# Patient Record
Sex: FEMALE | Race: BLACK OR AFRICAN AMERICAN | NOT HISPANIC OR LATINO | Employment: PART TIME | URBAN - METROPOLITAN AREA
[De-identification: names, ages, dates, MRNs, and addresses within clinical notes are randomized per-mention and may not be internally consistent; named-entity substitution may affect disease eponyms.]

---
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
## 2024-09-05 ENCOUNTER — OFFICE VISIT (OUTPATIENT)
Dept: OBSTETRICS AND GYNECOLOGY | Facility: CLINIC | Age: 19
End: 2024-09-05
Payer: MEDICAID

## 2024-09-05 VITALS
HEIGHT: 59 IN | SYSTOLIC BLOOD PRESSURE: 116 MMHG | DIASTOLIC BLOOD PRESSURE: 82 MMHG | BODY MASS INDEX: 36.11 KG/M2 | WEIGHT: 179.13 LBS

## 2024-09-05 DIAGNOSIS — Z11.3 SCREENING EXAMINATION FOR STD (SEXUALLY TRANSMITTED DISEASE): ICD-10-CM

## 2024-09-05 DIAGNOSIS — Z01.419 WELL WOMAN EXAM WITH ROUTINE GYNECOLOGICAL EXAM: Primary | ICD-10-CM

## 2024-09-05 DIAGNOSIS — N92.6 IRREGULAR PERIODS/MENSTRUAL CYCLES: ICD-10-CM

## 2024-09-05 LAB
B-HCG UR QL: NEGATIVE
CTP QC/QA: YES

## 2024-09-05 PROCEDURE — 87491 CHLMYD TRACH DNA AMP PROBE: CPT

## 2024-09-05 PROCEDURE — 99212 OFFICE O/P EST SF 10 MIN: CPT | Mod: PBBFAC

## 2024-09-05 PROCEDURE — 3079F DIAST BP 80-89 MM HG: CPT | Mod: CPTII,,,

## 2024-09-05 PROCEDURE — 81514 NFCT DS BV&VAGINITIS DNA ALG: CPT

## 2024-09-05 PROCEDURE — 99999PBSHW POCT URINE PREGNANCY: Mod: PBBFAC,,,

## 2024-09-05 PROCEDURE — 99999 PR PBB SHADOW E&M-EST. PATIENT-LVL II: CPT | Mod: PBBFAC,,,

## 2024-09-05 PROCEDURE — 3074F SYST BP LT 130 MM HG: CPT | Mod: CPTII,,,

## 2024-09-05 PROCEDURE — 3008F BODY MASS INDEX DOCD: CPT | Mod: CPTII,,,

## 2024-09-05 PROCEDURE — 81025 URINE PREGNANCY TEST: CPT | Mod: PBBFAC

## 2024-09-05 PROCEDURE — 99385 PREV VISIT NEW AGE 18-39: CPT | Mod: S$PBB,,,

## 2024-09-05 PROCEDURE — 1159F MED LIST DOCD IN RCRD: CPT | Mod: CPTII,,,

## 2024-09-05 NOTE — PROGRESS NOTES
CC: Annual  HISTORY OF PRESENT ILLNESS:    Layton Romero is a 18 y.o. female, , Patient's last menstrual period was 2024 (approximate).,  presents for a routine exam and has no complaints. She reports that she stopped Depo in around 2024 and cycles recently started to come back 2 months ago. We discussed that this is a normal side effect of Depo.   She is  sexually active. She uses condoms for contraception.  History of STDs: denies.  She does want STD screening.  Denies any other GYN complaints.      The patient participates in regular exercise: no.  The patient does not smoke.  The patient wears seatbelts.   Pt denies any domestic violence. Reports  tattoos or blood transfusions    SCREENING:   Pap: N/A   Gardasil Status: COMPLETE  Mammogram: N/A  Colonoscopy: N/A   DEXA:  N/A     GYN FH:   Breast cancer: none  Colon cancer: none  Ovarian cancer: none  Endometrial cancer: none     OB History    Para Term  AB Living   0 0 0 0 0 0   SAB IAB Ectopic Multiple Live Births   0 0 0 0 0        Past Medical History:  History reviewed. No pertinent past medical history.    Past Surgical History:  History reviewed. No pertinent surgical history.    Family History:  Family History   Problem Relation Name Age of Onset    Seizures Mother      No Known Problems Father      No Known Problems Sister      No Known Problems Sister      No Known Problems Sister      Hypertension Maternal Grandmother      Diabetes Maternal Grandmother      Heart disease Maternal Grandfather      Heart disease Paternal Grandmother      Breast cancer Neg Hx      Colon cancer Neg Hx      Ovarian cancer Neg Hx      Uterine cancer Neg Hx      Cervical cancer Neg Hx         Allergies:  Review of patient's allergies indicates:  No Known Allergies    Medications:  No current outpatient medications on file prior to visit.     No current facility-administered medications on file prior to visit.       Social History:  Social  History     Tobacco Use    Smoking status: Never     Passive exposure: Never    Smokeless tobacco: Never   Substance Use Topics    Alcohol use: Yes     Comment: Socially.    Drug use: Yes     Types: Marijuana               ROS:   GENERAL: Feeling well overall. Denies fever or chills.   SKIN: Denies rash or lesions.   HEAD: Denies head injury or headache.   NODES: Denies enlarged lymph nodes.   CHEST: Denies chest pain or shortness of breath.   CARDIOVASCULAR: Denies palpitations or left sided chest pain.   ABDOMEN: No abdominal pain, constipation, diarrhea, nausea, vomiting or rectal bleeding.   URINARY: No dysuria, hematuria, or burning on urination.  REPRODUCTIVE: See HPI.   BREASTS: Denies pain, lumps, or nipple discharge.   HEMATOLOGIC: No easy bruisability or excessive bleeding.   MUSCULOSKELETAL: Denies joint pain or swelling.   NEUROLOGIC: Denies syncope or weakness.   PSYCHIATRIC: Denies depression, anxiety or mood swings.    PE:   APPEARANCE: Well nourished, well developed, Black or  female in no acute distress.  NODES: no cervical, supraclavicular, or inguinal lymphadenopathy  BREASTS: Symmetrical, no skin changes or visible lesions. No palpable masses, nipple discharge or adenopathy bilaterally.  ABDOMEN: Soft. No tenderness or masses. No distention. No hernias palpated. No CVA tenderness.  VULVA: No lesions. Normal external female genitalia.  URETHRAL MEATUS: Normal size and location, no lesions, no prolapse.  URETHRA: No masses, tenderness, or prolapse.  VAGINA:  Moist. No lesions or lacerations noted. No abnormal discharge present. No odor present.   CERVIX: No lesions or discharge. No cervical motion tenderness.   UTERUS: Normal size, regular shape, mobile, non-tender.  ADNEXA: No tenderness. No fullness or masses palpated in the adnexal regions.   ANUS PERINEUM: Normal.      Diagnosis:  1. Well woman exam with routine gynecological exam    2. Screening examination for STD (sexually  transmitted disease)    3. Irregular periods/menstrual cycles        Plan:     Orders Placed This Encounter    Vaginosis Screen by DNA Probe    HIV 1/2 Ag/Ab (4th Gen)    HEPATITIS PANEL, ACUTE    Treponema Pallidium Antibodies IgG, IgM    C. trachomatis/N. gonorrhoeae by AMP DNA    POCT URINE PREGNANCY     - Self breast exams encouraged  - Pap not yet indicated.  - Screening tests as ordered.  - Diet and exercise encouraged.  Condom use encouraged for STD prevention.  Seat belt use encouraged.  Calcium and vitamin D recommended.     Counseling: injury prevention: Driving under the influence of alcohol  Weapons  Seatbelts  Bicycle helmets  Adequate sleep  Exercise  Stress management techniques  indications for and frequency of periodic gynecologic exam    The patient was counseled today on ACS PAP guidelines, with recommendations for yearly pelvic exams unless their uterus, cervix, and ovaries were removed for benign reasons; in that case, examinations every 3-5 years are recommended.  The patient was also counseled regarding monthly breast self-examination, routine STD screening for at-risk populations, prophylactic immunizations for transmitted infections such as  HPV, Pertussis, or Influenza as appropriate, and yearly mammograms when indicated by ACS guidelines.  She was advised to see her primary care physician for all other health maintenance.    Counseling session lasted approximately 10 minutes, and all her questions were answered.    Follow-up with me in 1 year for routine exam or sooner if needed.    NEGRO Cardenas-BC

## 2024-09-07 LAB
BACTERIAL VAGINOSIS DNA: NEGATIVE
C TRACH DNA SPEC QL NAA+PROBE: NOT DETECTED
CANDIDA GLABRATA DNA: NEGATIVE
CANDIDA KRUSEI DNA: NEGATIVE
CANDIDA RRNA VAG QL PROBE: NEGATIVE
N GONORRHOEA DNA SPEC QL NAA+PROBE: NOT DETECTED
T VAGINALIS RRNA GENITAL QL PROBE: NEGATIVE

## 2025-05-27 ENCOUNTER — OFFICE VISIT (OUTPATIENT)
Dept: OBSTETRICS AND GYNECOLOGY | Facility: CLINIC | Age: 20
End: 2025-05-27
Payer: MEDICAID

## 2025-05-27 VITALS
BODY MASS INDEX: 38.23 KG/M2 | SYSTOLIC BLOOD PRESSURE: 134 MMHG | WEIGHT: 189.25 LBS | DIASTOLIC BLOOD PRESSURE: 82 MMHG

## 2025-05-27 DIAGNOSIS — N76.0 ACUTE VAGINITIS: Primary | ICD-10-CM

## 2025-05-27 DIAGNOSIS — Z12.4 PAP SMEAR FOR CERVICAL CANCER SCREENING: ICD-10-CM

## 2025-05-27 PROCEDURE — 1159F MED LIST DOCD IN RCRD: CPT | Mod: CPTII,,,

## 2025-05-27 PROCEDURE — 3008F BODY MASS INDEX DOCD: CPT | Mod: CPTII,,,

## 2025-05-27 PROCEDURE — 3075F SYST BP GE 130 - 139MM HG: CPT | Mod: CPTII,,,

## 2025-05-27 PROCEDURE — 3079F DIAST BP 80-89 MM HG: CPT | Mod: CPTII,,,

## 2025-05-27 PROCEDURE — 99999 PR PBB SHADOW E&M-EST. PATIENT-LVL II: CPT | Mod: PBBFAC,,,

## 2025-05-27 PROCEDURE — 81515 NFCT DS BV&VAGINITIS DNA ALG: CPT

## 2025-05-27 PROCEDURE — 99213 OFFICE O/P EST LOW 20 MIN: CPT | Mod: S$PBB,,,

## 2025-05-27 PROCEDURE — 99212 OFFICE O/P EST SF 10 MIN: CPT | Mod: PBBFAC

## 2025-05-27 PROCEDURE — 87591 N.GONORRHOEAE DNA AMP PROB: CPT

## 2025-05-27 RX ORDER — FLUCONAZOLE 150 MG/1
150 TABLET ORAL
Qty: 2 TABLET | Refills: 0 | Status: SHIPPED | OUTPATIENT
Start: 2025-05-27

## 2025-05-27 NOTE — PROGRESS NOTES
Ms Layton Romero  is a 19 y.o.  female G 0 P 0 that presents with complaint of vaginal discharge and vaginal itching for past few  weeks. She reports symptoms started after using flagyl for BV. She began itching and treated with one diflucan. Reports itching is better but still feels like it has not totally resolved.   She reports white discharge.   She reports itching irritation, denies odor.  She reports h/o vaginitis. Denies nausea, vomiting, diarrhea, constipation, dysuria, dyspareunia, abdominal pain. She would like STD screening at this visit. No other concerns or complaints at this visit.      History reviewed. No pertinent past medical history.  History reviewed. No pertinent surgical history.  Social History[1]  Family History   Problem Relation Name Age of Onset    Seizures Mother      No Known Problems Father      No Known Problems Sister      No Known Problems Sister      No Known Problems Sister      Hypertension Maternal Grandmother      Diabetes Maternal Grandmother      Heart disease Maternal Grandfather      Heart disease Paternal Grandmother      Breast cancer Neg Hx      Colon cancer Neg Hx      Ovarian cancer Neg Hx      Uterine cancer Neg Hx      Cervical cancer Neg Hx       OB History    Para Term  AB Living   0 0 0 0 0 0   SAB IAB Ectopic Multiple Live Births   0 0 0 0 0       /82   Wt 85.9 kg (189 lb 4.2 oz)   LMP 2025   BMI 38.23 kg/m²     ROS:  GENERAL: No fever, chills, fatigability or weight loss.  VULVAR: No pain, no lesions and + itching.  VAGINAL: No relaxation, no abnormal bleeding and no lesions. + vaginal discharge  ABDOMEN: No abdominal pain. Denies nausea. Denies vomiting. No diarrhea. No constipation  BREAST: Denies pain. No lumps. No discharge.  URINARY: No incontinence, no nocturia, no frequency and no dysuria.  CARDIOVASCULAR: No chest pain. No shortness of breath. No leg cramps.  NEUROLOGICAL: No headaches. No vision changes.    PHYSICAL  EXAM:  VULVA: normal appearing vulva with no masses, tenderness or lesions   VAGINA: normal appearing vagina with normal color. +scant white discharge, no lesions   CERVIX: normal appearing cervix without discharge or lesions   UTERUS: uterus is normal size, shape, consistency and nontender   ADNEXA: normal adnexa in size, nontender and no masses    ASSESSMENT and PLAN:    ICD-10-CM ICD-9-CM    1. Acute vaginitis  N76.0 616.10 C. trachomatis/N. gonorrhoeae by AMP DNA      Vaginosis Screen by DNA Probe      fluconazole (DIFLUCAN) 150 MG Tab      2. Pap smear for cervical cancer screening  Z12.4 V76.2 Liquid-Based Pap Smear, Screening          Plan:   Prescriptions as noted in orders  Reviewed vulvar/vaginal care and hygiene  Return visit if symptoms persist or progress despite treatment  STD screening    Vaginitis Prevention:  - Avoiding feminine products such as deoderant soaps, body wash, bubble bath, douches, scented toilet paper, deoderant tampons or pads, feminine wipes, chronic pad use, etc. If you wash with soap make sure its hypo allergic (free of added scents or colors)   - Avoiding other vulvovaginal irritants such as long hot baths, humidity, tight, synthetic clothing, chlorine and sitting around in wet bathing suits  - Wearing cotton underwear, avoiding thong underwear and no underwear to bed-wear loose cotton bottoms to bed   - Taking showers instead of baths and use a hair dryer on cool setting afterwards to dry  - Wearing cotton to exercise and shower immediately after exercise and change clothes  - Using polyurethane condoms without spermicide if sexually active and symptoms are triggered by intercourse  - Use laundry detergent without added perfumes or colors   - Do not have sexual intercourse until symptoms have gone away   - Increase your intake of probiotics--you can get these by eating yogurt/greek yogurt or by buying over the counter probiotic supplements     Probiotic Information:   Any  probiotic you choose needs to have ALL of the following components (Each needs to be >10 colony forming units [CFUs]):   - L. Acidophilus  - L. Rhamnosus  - L. Fermentum       FOLLOW UP: PRN lack of improvement.    NEGRO Cardenas-BC         [1]   Social History  Tobacco Use    Smoking status: Never     Passive exposure: Never    Smokeless tobacco: Never   Substance Use Topics    Alcohol use: Yes     Comment: Socially.    Drug use: Yes     Types: Marijuana

## 2025-05-29 LAB
C TRACH DNA SPEC QL NAA+PROBE: NOT DETECTED
CTGC SOURCE (OHS) ORD-325: NORMAL
N GONORRHOEA DNA UR QL NAA+PROBE: NOT DETECTED

## 2025-05-30 ENCOUNTER — RESULTS FOLLOW-UP (OUTPATIENT)
Dept: OBSTETRICS AND GYNECOLOGY | Facility: CLINIC | Age: 20
End: 2025-05-30

## 2025-05-30 LAB
BACTERIAL VAGINOSIS DNA (OHS): NOT DETECTED
CANDIDA GLABRATA/KRUSEI DNA (OHS): NOT DETECTED
CANDIDA SPECIES DNA (OHS): NOT DETECTED
TRICHOMONAS VAGINALIS DNA (OHS): NOT DETECTED

## 2025-06-02 ENCOUNTER — PATIENT MESSAGE (OUTPATIENT)
Dept: OBSTETRICS AND GYNECOLOGY | Facility: CLINIC | Age: 20
End: 2025-06-02
Payer: MEDICAID

## 2025-06-11 ENCOUNTER — PROCEDURE VISIT (OUTPATIENT)
Dept: OBSTETRICS AND GYNECOLOGY | Facility: CLINIC | Age: 20
End: 2025-06-11
Payer: MEDICAID

## 2025-06-11 VITALS
SYSTOLIC BLOOD PRESSURE: 108 MMHG | BODY MASS INDEX: 37.49 KG/M2 | WEIGHT: 185.63 LBS | DIASTOLIC BLOOD PRESSURE: 80 MMHG

## 2025-06-11 DIAGNOSIS — R87.613 HIGH GRADE SQUAMOUS INTRAEPITHELIAL LESION (HGSIL) ON PAPANICOLAOU SMEAR: Primary | ICD-10-CM

## 2025-06-11 PROCEDURE — 57454 BX/CURETT OF CERVIX W/SCOPE: CPT | Mod: PBBFAC | Performed by: OBSTETRICS & GYNECOLOGY

## 2025-06-11 NOTE — PROCEDURES
Colposcopy    Date/Time: 6/11/2025 2:15 PM    Performed by: Abdifatah Browne MD  Authorized by: Abdifatah Browne MD    Consent obatined:  Prior to procedure the appropriate consent was completed and verified  Timeout:Immediately prior to procedure a time out was called to verify the correct patient, procedure, equipment, support staff and site/side marked as required  Prep:Patient was prepped and draped in the usual sterile fashion  Assistants?: No      Colposcopy Site:  Cervix  Position:  Supine  Acrowhite Lesion? Yes    Atypical Vessels: No    Transformation Zone Adequate?: Yes    Biopsy?: Yes         Location:  Cervix ((2 00 and 7 00))  ECC Performed?: Yes    LEEP Performed?: No    Estimated blood loss (cc):  1   Patient tolerated the procedure well with no immediate complications.     Pap: HSIL

## 2025-06-17 ENCOUNTER — RESULTS FOLLOW-UP (OUTPATIENT)
Dept: OBSTETRICS AND GYNECOLOGY | Facility: CLINIC | Age: 20
End: 2025-06-17

## 2025-06-27 ENCOUNTER — PATIENT MESSAGE (OUTPATIENT)
Dept: OBSTETRICS AND GYNECOLOGY | Facility: CLINIC | Age: 20
End: 2025-06-27
Payer: MEDICAID